# Patient Record
Sex: MALE | Race: WHITE | NOT HISPANIC OR LATINO | Employment: FULL TIME | URBAN - METROPOLITAN AREA
[De-identification: names, ages, dates, MRNs, and addresses within clinical notes are randomized per-mention and may not be internally consistent; named-entity substitution may affect disease eponyms.]

---

## 2023-11-22 ENCOUNTER — APPOINTMENT (OUTPATIENT)
Dept: RADIOLOGY | Facility: CLINIC | Age: 63
End: 2023-11-22
Payer: COMMERCIAL

## 2023-12-08 ENCOUNTER — APPOINTMENT (OUTPATIENT)
Dept: RADIOLOGY | Facility: CLINIC | Age: 63
End: 2023-12-08
Payer: COMMERCIAL

## 2023-12-28 ENCOUNTER — ANCILLARY PROCEDURE (OUTPATIENT)
Dept: RADIOLOGY | Facility: CLINIC | Age: 63
End: 2023-12-28
Payer: COMMERCIAL

## 2023-12-28 DIAGNOSIS — M54.16 RADICULOPATHY, LUMBAR REGION: ICD-10-CM

## 2023-12-28 PROCEDURE — 72148 MRI LUMBAR SPINE W/O DYE: CPT

## 2024-02-01 ENCOUNTER — APPOINTMENT (OUTPATIENT)
Dept: ORTHOPEDIC SURGERY | Facility: CLINIC | Age: 64
End: 2024-02-01

## 2024-02-14 ENCOUNTER — APPOINTMENT (OUTPATIENT)
Dept: ORTHOPEDIC SURGERY | Facility: CLINIC | Age: 64
End: 2024-02-14

## 2024-03-06 ENCOUNTER — APPOINTMENT (OUTPATIENT)
Dept: ORTHOPEDIC SURGERY | Facility: CLINIC | Age: 64
End: 2024-03-06

## 2024-06-05 NOTE — PATIENT INSTRUCTIONS
It was nice seeing you today.    Please go to [facility] lab or another [facility] lab facility to complete the lab testing that I ordered      Please call radiology to schedule CT Heart Ca scoring and CT lung cancer screening; schedule to have completed on the same day      Please call referral line to schedule colonoscopy that is now due     Please continue to see your speciality team for chronic right hip pain     I recommend receiving the TDAP booster that prevents tetanus and other infectious disease, shingles vaccine    I recommend the updated COVID vaccine and RSV that can be obtained at your local pharmacy    Please schedule a follow up with me in 4  weeks to discuss and review your test results

## 2024-06-05 NOTE — PROGRESS NOTES
Subjective   Otoniel Barriga is a 63 y.o. male who presents for NPV TO ESTABLISH PCP CARE and FOR CARE GAP REVIEW.    HPI:      63 y.o. male SMOKER (1 ppd x 30+ years; now smokes 1/2 ppd) who presents for NPV TO ESTABLISH PCP CARE and FOR CARE GAP REVIEW.     EMR/EPIC records reviewed.    PMHx:  Borderline diabetes: HgBA1c  6.4 (11/14/23)  HTN  Hyperlipidemia; on crestor  anxiety  Chronic right buttock, hip pain with sciatica down the right leg  s/p fall 8/2023==> follows with spine, ortho, and pain management.   S/p laminectomy 3/5/24  On disability  for back pain    PAST MEDICAL HISTORY   Diagnosis Date    Acute, but ill-defined, cerebrovascular disease 03/2007     distribution: right cerebral hemisphere; old left thalamus lacunar infarct    Arthritis      B12 deficiency 2017    Carotid artery disease (HCC) 2011     <30% left and 30-50% right    Colon polyp 05/2011     sessile    Essential hypertension, benign      Family history of colonic polyps      High cholesterol      History of tobacco abuse      Meniscus tear 2013     left side: s/p surgery    Mixed hyperlipidemia      Ptosis, both eyelids      TIA (transient ischemic attack) 2003    Unspecified hypothyroidism        Healthcare Providers:  Prior [facility]  PCP: Cuong Acosta MD ; last seen 12/23/23    Preventive Health Services:  -Last physical:  -last PSA:  ?  -last colonoscopy: 2020; per patient next due 5 years==> NEXT DUE 2025  -last STI screening: ?  -Hep C screening: ?    Immunizations:   -Childhood vaccines: completed per patient    -COVID vaccine and booster:  -updated COVID spike vaccine: received  -shingles: NOW DUE  -TDAP:   at pharmacy      Immunization History   Administered Date(s) Administered    Pfizer COVID-19 vaccine, bivalent, age 12 years and older (30 mcg/0.3 mL) 12/21/2022    Pfizer Purple Cap SARS-CoV-2 02/26/2021, 03/26/2021, 11/18/2021         Today Otoniel  reports:    - doing and feeling well other than ongoing back pain s/p  "laminectomy    -on disability for back pain    -need a new spine surgeon at  to follow with    -taking all medications as prescribed with no reported adverse medication side effects      Today he has no other reported complaints, issues, or problems.    ROS is NEG for HEADACHE, NAUSEA, VOMITING, DIARRHEA, CHEST PAIN, SOB, and BLEEDING.  Review of systems (10+) is negative for all systems except for any identified issues in HPI above.    Objective     /74   Pulse 83   Temp 36.5 °C (97.7 °F)   Ht 1.702 m (5' 7\")   Wt 82.1 kg (181 lb)   SpO2 98%   BMI 28.35 kg/m²      Physical Examination:       GENERAL           General Appearance: well-appearing, well-developed, well-hydrated, well-nourished, no acute distress.        HEENT           NECK supple, no masses or thyromegaly, no carotid bruit.        EYES           Extraocular Movements: normal, bilateral eyes CHARMAINE, no conjunctival injection.        HEART           Rate and Rhythm regular rate and rhythm. Heart sounds: normal S1S2, no S3 or S4. Murmurs: none.        CHEST           Breath sounds: Clear to IPPA, RR<16 no use of accessory muscles.        ABDOMEN           General: Neg for LKKS or masses, no scleral icterus or jaundice.        MUSCULOSKELETAL           Joints Demonstration: Neg for erythema, swelling or joint deformities. gross abnormalities no gross abnormalities.        EXTREMITIES           Lower Extremities: Neg for cyanosis, clubbing or edema.       Assessment/Plan   Problem List Items Addressed This Visit    None  Visit Diagnoses       Establishing care with new doctor, encounter for    -  Primary    Primary hypertension        Mixed hyperlipidemia        Borderline diabetes        Type 2 diabetes mellitus without complication, without long-term current use of insulin (Multi)        Chronic right hip pain        Vitamin D deficiency        Encounter for hepatitis C screening test for low risk patient        Routine screening for STI " (sexually transmitted infection)        Colon cancer screening        Immunization counseling        Prostate cancer screening        Encounter for screening for coronary artery disease              Establish PCP care  -labs ordered (see A/P above for details)    HTN: elevated today; previously well controlled per patient  -CMP, UA ordered  -cont BP medications  -low  salt, low cholesterol diet, regular exercise, and limit alcohol intake    Hyperlipidemia  -lipid panel ordered  -cont statin  -low salt, low cholesterol diet, regular exercise, and limit alcohol intake    Borderline DM2: POCT HgBA1c:   5.6< TODAY 6/6/24 < 6.4  -POCT HgBA1c, CMP, UA, urine albumin ordered  - low salt, low cholesterol diet, regular exercise, and limit alcohol intake    Right hip pain/injury/chronic pain:  s/p laminectomy  -cont management per ortho spine and pain management specialist team.   -referral to  ortho spine ordered  -prior PCP wrote Letter written to extend time from work until 1/8/24.   -further time from work, treatment, course to be determined by specialist team and then functional capacity evaluation if needed.      Vitamin D deficiency  -Vit D levels ordered    Prostate Cancer Screening:  -PSA ordered    Heart Disease Screening:  CT Heart Ca scoring ordered     Hep C screening  -Hep C antibody ordered     STI Screening:  -HIV, syphilis ordered      Colon polyps and Colon Cancer Screening: last colonoscopy  5/2020;    -next colonoscopy due 2025      Counseling:       Medication education:         Education:  The patient is counseled regarding potential side-effects of all new medications        Understanding:  Patient expressed understanding        Adherence:  No barriers to adherence identified        Immunizations Counseling  -shingles and TDAP now due==> declined today  -recommend updated COVID spike vaccine that can be obtained at local pharmacy     FOLLOW-UP: 4 weeks to discuss and review test results and 8 weeks  for PHYSICAL     Discussed recommended plan of care with patient. Patient expressed understanding and agreement with plan of care. All of patient's questions were answered at the time. Patient had no additional questions at the time.               Keyanna Mai MD, PhD

## 2024-06-06 ENCOUNTER — OFFICE VISIT (OUTPATIENT)
Dept: PRIMARY CARE | Facility: CLINIC | Age: 64
End: 2024-06-06
Payer: COMMERCIAL

## 2024-06-06 VITALS
BODY MASS INDEX: 28.41 KG/M2 | HEIGHT: 67 IN | WEIGHT: 181 LBS | OXYGEN SATURATION: 98 % | DIASTOLIC BLOOD PRESSURE: 74 MMHG | HEART RATE: 83 BPM | SYSTOLIC BLOOD PRESSURE: 150 MMHG | TEMPERATURE: 97.7 F

## 2024-06-06 DIAGNOSIS — Z12.2 SCREENING FOR LUNG CANCER: ICD-10-CM

## 2024-06-06 DIAGNOSIS — I10 PRIMARY HYPERTENSION: ICD-10-CM

## 2024-06-06 DIAGNOSIS — Z76.89 ESTABLISHING CARE WITH NEW DOCTOR, ENCOUNTER FOR: Primary | ICD-10-CM

## 2024-06-06 DIAGNOSIS — Z12.5 PROSTATE CANCER SCREENING: ICD-10-CM

## 2024-06-06 DIAGNOSIS — Z98.890 S/P LAMINECTOMY: ICD-10-CM

## 2024-06-06 DIAGNOSIS — Z71.85 IMMUNIZATION COUNSELING: ICD-10-CM

## 2024-06-06 DIAGNOSIS — Z11.59 ENCOUNTER FOR HEPATITIS C SCREENING TEST FOR LOW RISK PATIENT: ICD-10-CM

## 2024-06-06 DIAGNOSIS — G89.29 CHRONIC RIGHT HIP PAIN: ICD-10-CM

## 2024-06-06 DIAGNOSIS — E55.9 VITAMIN D DEFICIENCY: ICD-10-CM

## 2024-06-06 DIAGNOSIS — Z11.3 ROUTINE SCREENING FOR STI (SEXUALLY TRANSMITTED INFECTION): ICD-10-CM

## 2024-06-06 DIAGNOSIS — Z12.11 COLON CANCER SCREENING: ICD-10-CM

## 2024-06-06 DIAGNOSIS — F41.9 ANXIETY: ICD-10-CM

## 2024-06-06 DIAGNOSIS — E11.9 TYPE 2 DIABETES MELLITUS WITHOUT COMPLICATION, WITHOUT LONG-TERM CURRENT USE OF INSULIN (MULTI): ICD-10-CM

## 2024-06-06 DIAGNOSIS — E78.2 MIXED HYPERLIPIDEMIA: ICD-10-CM

## 2024-06-06 DIAGNOSIS — Z13.6 ENCOUNTER FOR SCREENING FOR CORONARY ARTERY DISEASE: ICD-10-CM

## 2024-06-06 DIAGNOSIS — M25.551 CHRONIC RIGHT HIP PAIN: ICD-10-CM

## 2024-06-06 DIAGNOSIS — R73.03 BORDERLINE DIABETES: ICD-10-CM

## 2024-06-06 LAB — POC HEMOGLOBIN A1C: 5.6 % (ref 4.2–6.5)

## 2024-06-06 PROCEDURE — 4010F ACE/ARB THERAPY RXD/TAKEN: CPT | Performed by: FAMILY MEDICINE

## 2024-06-06 PROCEDURE — 99204 OFFICE O/P NEW MOD 45 MIN: CPT | Performed by: FAMILY MEDICINE

## 2024-06-06 PROCEDURE — 83036 HEMOGLOBIN GLYCOSYLATED A1C: CPT | Performed by: FAMILY MEDICINE

## 2024-06-06 PROCEDURE — 4004F PT TOBACCO SCREEN RCVD TLK: CPT | Performed by: FAMILY MEDICINE

## 2024-06-06 PROCEDURE — 3078F DIAST BP <80 MM HG: CPT | Performed by: FAMILY MEDICINE

## 2024-06-06 PROCEDURE — 3077F SYST BP >= 140 MM HG: CPT | Performed by: FAMILY MEDICINE

## 2024-06-06 RX ORDER — METOPROLOL TARTRATE 25 MG/1
25 TABLET, FILM COATED ORAL DAILY
COMMUNITY

## 2024-06-06 RX ORDER — ROSUVASTATIN CALCIUM 40 MG/1
40 TABLET, COATED ORAL
COMMUNITY
Start: 2023-12-28

## 2024-06-06 RX ORDER — LEVOTHYROXINE SODIUM 25 UG/1
25 TABLET ORAL
COMMUNITY
Start: 2023-03-31

## 2024-06-06 RX ORDER — OXAZEPAM 15 MG/1
15 CAPSULE ORAL DAILY
COMMUNITY
Start: 2024-04-17

## 2024-06-06 RX ORDER — LISINOPRIL 20 MG/1
20 TABLET ORAL DAILY
COMMUNITY

## 2024-06-06 RX ORDER — ASPIRIN 81 MG/1
81 TABLET ORAL
COMMUNITY

## 2024-06-06 ASSESSMENT — COLUMBIA-SUICIDE SEVERITY RATING SCALE - C-SSRS
1. IN THE PAST MONTH, HAVE YOU WISHED YOU WERE DEAD OR WISHED YOU COULD GO TO SLEEP AND NOT WAKE UP?: NO
6. HAVE YOU EVER DONE ANYTHING, STARTED TO DO ANYTHING, OR PREPARED TO DO ANYTHING TO END YOUR LIFE?: NO
2. HAVE YOU ACTUALLY HAD ANY THOUGHTS OF KILLING YOURSELF?: NO

## 2024-06-06 ASSESSMENT — PATIENT HEALTH QUESTIONNAIRE - PHQ9
1. LITTLE INTEREST OR PLEASURE IN DOING THINGS: NOT AT ALL
SUM OF ALL RESPONSES TO PHQ9 QUESTIONS 1 AND 2: 0
2. FEELING DOWN, DEPRESSED OR HOPELESS: NOT AT ALL

## 2024-06-06 ASSESSMENT — PAIN SCALES - GENERAL: PAINLEVEL: 4

## 2024-06-26 DIAGNOSIS — Z76.0 MEDICATION REFILL: ICD-10-CM

## 2024-06-26 RX ORDER — LEVOTHYROXINE SODIUM 25 UG/1
25 TABLET ORAL
Qty: 90 TABLET | Refills: 0 | Status: SHIPPED | OUTPATIENT
Start: 2024-06-26

## 2024-06-26 RX ORDER — LISINOPRIL 20 MG/1
20 TABLET ORAL DAILY
Qty: 90 TABLET | Refills: 0 | Status: SHIPPED | OUTPATIENT
Start: 2024-06-26

## 2024-06-26 RX ORDER — ROSUVASTATIN CALCIUM 40 MG/1
40 TABLET, COATED ORAL
Qty: 90 TABLET | Refills: 0 | Status: SHIPPED | OUTPATIENT
Start: 2024-06-26

## 2024-07-08 ENCOUNTER — APPOINTMENT (OUTPATIENT)
Dept: PRIMARY CARE | Facility: CLINIC | Age: 64
End: 2024-07-08
Payer: COMMERCIAL

## 2024-07-29 ENCOUNTER — APPOINTMENT (OUTPATIENT)
Dept: PRIMARY CARE | Facility: CLINIC | Age: 64
End: 2024-07-29
Payer: COMMERCIAL

## 2024-08-09 ENCOUNTER — APPOINTMENT (OUTPATIENT)
Dept: PRIMARY CARE | Facility: CLINIC | Age: 64
End: 2024-08-09
Payer: COMMERCIAL

## 2025-01-27 DIAGNOSIS — I10 PRIMARY HYPERTENSION: ICD-10-CM

## 2025-01-30 RX ORDER — LISINOPRIL 20 MG/1
20 TABLET ORAL DAILY
Qty: 90 TABLET | Refills: 0 | Status: SHIPPED | OUTPATIENT
Start: 2025-01-30

## 2025-07-27 DIAGNOSIS — I10 PRIMARY HYPERTENSION: ICD-10-CM

## 2025-07-28 RX ORDER — LISINOPRIL 20 MG/1
20 TABLET ORAL DAILY
Qty: 90 TABLET | Refills: 0 | Status: SHIPPED | OUTPATIENT
Start: 2025-07-28

## 2025-07-28 NOTE — TELEPHONE ENCOUNTER
Patient has not been seen in over 1 year; will provide 1 refill.  For additional refills he needs an in person visit

## 2025-07-28 NOTE — TELEPHONE ENCOUNTER
PT has [facility], which is out of network. PT is established with a primary care provider with the [facility]. No appointment needed with our office.